# Patient Record
Sex: FEMALE | Race: WHITE
[De-identification: names, ages, dates, MRNs, and addresses within clinical notes are randomized per-mention and may not be internally consistent; named-entity substitution may affect disease eponyms.]

---

## 2021-01-21 ENCOUNTER — HOSPITAL ENCOUNTER (OUTPATIENT)
Dept: HOSPITAL 82 - ENDO | Age: 61
Discharge: HOME | End: 2021-01-21
Attending: SURGERY
Payer: COMMERCIAL

## 2021-01-21 VITALS — SYSTOLIC BLOOD PRESSURE: 110 MMHG | DIASTOLIC BLOOD PRESSURE: 56 MMHG

## 2021-01-21 VITALS — BODY MASS INDEX: 31.15 KG/M2 | HEIGHT: 61 IN | WEIGHT: 165 LBS

## 2021-01-21 DIAGNOSIS — F41.9: ICD-10-CM

## 2021-01-21 DIAGNOSIS — K44.9: Primary | ICD-10-CM

## 2021-01-21 DIAGNOSIS — F32.9: ICD-10-CM

## 2021-01-21 DIAGNOSIS — K21.9: ICD-10-CM

## 2021-01-21 DIAGNOSIS — K29.80: ICD-10-CM

## 2021-01-21 DIAGNOSIS — Z20.822: ICD-10-CM

## 2021-01-21 DIAGNOSIS — J43.9: ICD-10-CM

## 2021-02-18 ENCOUNTER — HOSPITAL ENCOUNTER (EMERGENCY)
Dept: HOSPITAL 82 - ED | Age: 61
Discharge: HOME | End: 2021-02-18
Payer: COMMERCIAL

## 2021-02-18 VITALS — SYSTOLIC BLOOD PRESSURE: 139 MMHG | DIASTOLIC BLOOD PRESSURE: 78 MMHG

## 2021-02-18 VITALS — BODY MASS INDEX: 35.8 KG/M2 | HEIGHT: 61 IN | WEIGHT: 189.6 LBS

## 2021-02-18 DIAGNOSIS — K21.9: ICD-10-CM

## 2021-02-18 DIAGNOSIS — Z20.822: ICD-10-CM

## 2021-02-18 DIAGNOSIS — F41.9: ICD-10-CM

## 2021-02-18 DIAGNOSIS — J43.9: Primary | ICD-10-CM

## 2021-02-18 DIAGNOSIS — M54.5: ICD-10-CM

## 2021-02-18 DIAGNOSIS — F32.9: ICD-10-CM

## 2021-02-18 LAB
ALBUMIN SERPL-MCNC: 4.8 G/DL (ref 3.2–5)
ALP SERPL-CCNC: 112 U/L (ref 38–126)
ANION GAP SERPL CALCULATED.3IONS-SCNC: 11 MMOL/L
AST SERPL-CCNC: 38 U/L (ref 9–36)
BASOPHILS NFR BLD AUTO: 0 % (ref 0–3)
BILIRUB UR QL STRIP.AUTO: NEGATIVE
BUN SERPL-MCNC: 41 MG/DL (ref 8–23)
BUN/CREAT SERPL: 18
CHLORIDE SERPL-SCNC: 93 MMOL/L (ref 95–108)
CO2 SERPL-SCNC: 35 MMOL/L (ref 22–30)
COLOR UR AUTO: YELLOW
CREAT SERPL-MCNC: 2.2 MG/DL (ref 0.5–1)
EOSINOPHIL NFR BLD AUTO: 1 % (ref 0–8)
ERYTHROCYTE [DISTWIDTH] IN BLOOD BY AUTOMATED COUNT: 17.9 % (ref 11.5–15.5)
GLUCOSE UR STRIP.AUTO-MCNC: NEGATIVE MG/DL
HCT VFR BLD AUTO: 33.5 % (ref 37–47)
HGB BLD-MCNC: 10.3 G/DL (ref 12–16)
HGB UR QL STRIP.AUTO: NEGATIVE
IMM GRANULOCYTES NFR BLD: 0.3 % (ref 0–5)
KETONES UR STRIP.AUTO-MCNC: NEGATIVE MG/DL
LEUKOCYTE ESTERASE UR QL STRIP.AUTO: NEGATIVE
LYMPHOCYTES NFR BLD: 15 % (ref 15–41)
MCH RBC QN AUTO: 25.1 PG  CALC (ref 26–32)
MCHC RBC AUTO-ENTMCNC: 30.7 G/DL CAL (ref 32–36)
MCV RBC AUTO: 81.7 FL  CALC (ref 80–100)
MONOCYTES NFR BLD AUTO: 7 % (ref 2–13)
MYOGLOBIN SERPL-MCNC: 94 NG/ML (ref 0–62)
NEUTROPHILS # BLD AUTO: 5.1 THOU/UL (ref 2–7.15)
NEUTROPHILS NFR BLD AUTO: 77 % (ref 42–76)
NITRITE UR QL STRIP.AUTO: NEGATIVE
PH UR STRIP.AUTO: 5.5 [PH] (ref 4.5–8)
PLATELET # BLD AUTO: 329 THOU/UL (ref 130–400)
POTASSIUM SERPL-SCNC: 4.1 MMOL/L (ref 3.5–5.1)
PROT SERPL-MCNC: 7.8 G/DL (ref 6.3–8.2)
PROT UR QL STRIP.AUTO: NEGATIVE MG/DL
RBC # BLD AUTO: 4.1 MILL/UL (ref 4.2–5.6)
SODIUM SERPL-SCNC: 135 MMOL/L (ref 137–146)
SP GR UR STRIP.AUTO: 1.01
UROBILINOGEN UR QL STRIP.AUTO: 0.2 E.U./DL

## 2021-03-25 ENCOUNTER — HOSPITAL ENCOUNTER (INPATIENT)
Dept: HOSPITAL 82 - ED | Age: 61
LOS: 2 days | Discharge: HOME | DRG: 190 | End: 2021-03-27
Attending: INTERNAL MEDICINE | Admitting: INTERNAL MEDICINE
Payer: COMMERCIAL

## 2021-03-25 VITALS — DIASTOLIC BLOOD PRESSURE: 78 MMHG | SYSTOLIC BLOOD PRESSURE: 114 MMHG

## 2021-03-25 VITALS — WEIGHT: 175.49 LBS | HEIGHT: 61 IN | BODY MASS INDEX: 33.13 KG/M2

## 2021-03-25 VITALS — SYSTOLIC BLOOD PRESSURE: 118 MMHG | DIASTOLIC BLOOD PRESSURE: 78 MMHG

## 2021-03-25 VITALS — SYSTOLIC BLOOD PRESSURE: 108 MMHG | DIASTOLIC BLOOD PRESSURE: 75 MMHG

## 2021-03-25 VITALS — DIASTOLIC BLOOD PRESSURE: 93 MMHG | SYSTOLIC BLOOD PRESSURE: 151 MMHG

## 2021-03-25 DIAGNOSIS — E87.8: ICD-10-CM

## 2021-03-25 DIAGNOSIS — J45.909: ICD-10-CM

## 2021-03-25 DIAGNOSIS — J96.21: ICD-10-CM

## 2021-03-25 DIAGNOSIS — E87.1: ICD-10-CM

## 2021-03-25 DIAGNOSIS — I50.9: ICD-10-CM

## 2021-03-25 DIAGNOSIS — F32.9: ICD-10-CM

## 2021-03-25 DIAGNOSIS — I13.0: ICD-10-CM

## 2021-03-25 DIAGNOSIS — J43.9: Primary | ICD-10-CM

## 2021-03-25 DIAGNOSIS — K21.9: ICD-10-CM

## 2021-03-25 DIAGNOSIS — F41.9: ICD-10-CM

## 2021-03-25 DIAGNOSIS — E78.5: ICD-10-CM

## 2021-03-25 DIAGNOSIS — N18.30: ICD-10-CM

## 2021-03-25 DIAGNOSIS — N39.0: ICD-10-CM

## 2021-03-25 DIAGNOSIS — Z99.81: ICD-10-CM

## 2021-03-25 DIAGNOSIS — D63.1: ICD-10-CM

## 2021-03-25 DIAGNOSIS — E86.9: ICD-10-CM

## 2021-03-25 DIAGNOSIS — N17.9: ICD-10-CM

## 2021-03-25 DIAGNOSIS — Z20.822: ICD-10-CM

## 2021-03-25 LAB
ALBUMIN SERPL-MCNC: 4.4 G/DL (ref 3.2–5)
ALP SERPL-CCNC: 135 U/L (ref 38–126)
ANION GAP SERPL CALCULATED.3IONS-SCNC: 20 MMOL/L
AST SERPL-CCNC: 32 U/L (ref 9–36)
BACTERIA #/AREA URNS HPF: (no result) HPF
BASOPHILS NFR BLD AUTO: 0 % (ref 0–3)
BILIRUB UR QL STRIP.AUTO: NEGATIVE
BUN SERPL-MCNC: 48 MG/DL (ref 8–23)
BUN/CREAT SERPL: 14
CHLORIDE SERPL-SCNC: 89 MMOL/L (ref 95–108)
CO2 SERPL-SCNC: 22 MMOL/L (ref 22–30)
COLOR UR AUTO: YELLOW
CREAT SERPL-MCNC: 3.5 MG/DL (ref 0.5–1)
D DIMER PPP FEU-MCNC: 1.47 MG/L (ref 0.19–0.6)
EOSINOPHIL NFR BLD AUTO: 2 % (ref 0–8)
ERYTHROCYTE [DISTWIDTH] IN BLOOD BY AUTOMATED COUNT: 17.4 % (ref 11.5–15.5)
GLUCOSE UR STRIP.AUTO-MCNC: NEGATIVE MG/DL
HCT VFR BLD AUTO: 31 % (ref 37–47)
HGB BLD-MCNC: 9.4 G/DL (ref 12–16)
HGB UR QL STRIP.AUTO: (no result)
IMM GRANULOCYTES NFR BLD: 0.3 % (ref 0–5)
INR PPP: 1 RATIO (ref 0.7–1.3)
KETONES UR STRIP.AUTO-MCNC: NEGATIVE MG/DL
LEUKOCYTE ESTERASE UR QL STRIP.AUTO: (no result)
LYMPHOCYTES NFR BLD: 13 % (ref 15–41)
MCH RBC QN AUTO: 24.5 PG  CALC (ref 26–32)
MCHC RBC AUTO-ENTMCNC: 30.3 G/DL CAL (ref 32–36)
MCV RBC AUTO: 80.7 FL  CALC (ref 80–100)
MONOCYTES NFR BLD AUTO: 9 % (ref 2–13)
MYOGLOBIN SERPL-MCNC: 285 NG/ML (ref 0–62)
NEUTROPHILS # BLD AUTO: 7.29 THOU/UL (ref 2–7.15)
NEUTROPHILS NFR BLD AUTO: 76 % (ref 42–76)
NITRITE UR QL STRIP.AUTO: NEGATIVE
PH UR STRIP.AUTO: 5.5 [PH] (ref 4.5–8)
PLATELET # BLD AUTO: 293 THOU/UL (ref 130–400)
POTASSIUM SERPL-SCNC: 4.2 MMOL/L (ref 3.5–5.1)
PROT SERPL-MCNC: 7.4 G/DL (ref 6.3–8.2)
PROT UR QL STRIP.AUTO: (no result) MG/DL
PROTHROMBIN TIME: 10.6 SECONDS (ref 9–12.5)
RBC # BLD AUTO: 3.84 MILL/UL (ref 4.2–5.6)
RBC #/AREA URNS HPF: (no result) RBC/HPF (ref 0–5)
SODIUM SERPL-SCNC: 127 MMOL/L (ref 137–146)
SP GR UR STRIP.AUTO: 1.01
SQUAMOUS URNS QL MICRO: (no result) EPI/HPF
UROBILINOGEN UR QL STRIP.AUTO: 0.2 E.U./DL
WBC #/AREA URNS HPF: (no result) WBC/HPF (ref 0–5)

## 2021-03-25 PROCEDURE — A9540 TC99M MAA: HCPCS

## 2021-03-25 NOTE — NUR
PT RESTING NO NEW COMPLAINTS OFFERED, PT REMAINS WINDED WITH MINIMAL EXERTION
BUT RECOVERS WELL WITH REST, SATS2 REMAIN 100% ON 3L. WILL CONTINUE TO MONITOR

## 2021-03-25 NOTE — NUR
PT RESTING IN THE BED, STATES PAIN "IS GOING AWAY" . CALL LIGHT IN REACH NO
DISTRESS NOTED AT THIS TIME.

## 2021-03-25 NOTE — NUR
PT ALERT AND ORIENTED SATETS THAT SHE HAS HAD WORSENIGN SOB FOR LAST 3 DAYS,
LUNGS VERY DIMINSHED WITH WHEEZES, SOB WITH EXERTION NOTED, PT HAS O2 ON AT 3L
VIA NC, WITH PT BEING O2 DEPENDENT AT HOME AS WELL. PT STATES SHE IS BREATHING
A LITTLE BETTER SINCE SHE DID HOME NEB TX AND INHALER BUT THAT SHE STILL SOB
WITH EXERTION AND HER CHEST STILL HURTS WITH INSPIRATION AND EXPIRATION, THE
PAIN IS MIDSTERNAL AND RADIATES UNDER BREASTS AND AT TIMES THRU TO BACK.

## 2021-03-25 NOTE — NUR
RECEIVED REPORT FOR THIS PT AND IN BED WITH EYES CLOSED.  NO S/S OF DISTRESS
NOTED.  WILL CONTINUE TO OBSERVE

## 2021-03-25 NOTE — NUR
TALKING AND LAUGHING WITH EMS. NO DISTRESS NOTED. STATES BREATHING IMPROVED
SINCE TAKING HOME INHALER AND NEB TX. CONT CONT BACK PAIN WITH BREATHING (HX
COMPRESSION FX)

## 2021-03-25 NOTE — NUR
PT RESTING IN THE BED AXOX3, 02 3L NC IN PLACE, DENIES PAIN. REPOSITIONED FOR
COMFORT, SIDE RAILS UP CALL LIGHT IN REACH, ALL SAFTY MEASURES IN PLACE WILL
CONTINUE TO MONIOTR THE PATIENT.

## 2021-03-25 NOTE — NUR
PT HAD DUONEB AND ALBUTEROL ON EMAR SINCE 0100. DAYSHIFT RT DOCUMENTED OFF ON
THE LATE MEDS, WAS TO BE ADMINISTERED BY NIGHTSHIFT, OR CANCELED BY
NIGHTSHIFT.

## 2021-03-26 VITALS — DIASTOLIC BLOOD PRESSURE: 78 MMHG | SYSTOLIC BLOOD PRESSURE: 113 MMHG

## 2021-03-26 VITALS — SYSTOLIC BLOOD PRESSURE: 111 MMHG | DIASTOLIC BLOOD PRESSURE: 63 MMHG

## 2021-03-26 VITALS — SYSTOLIC BLOOD PRESSURE: 114 MMHG | DIASTOLIC BLOOD PRESSURE: 84 MMHG

## 2021-03-26 VITALS — DIASTOLIC BLOOD PRESSURE: 69 MMHG | SYSTOLIC BLOOD PRESSURE: 115 MMHG

## 2021-03-26 LAB
ALBUMIN SERPL-MCNC: 4 G/DL (ref 3.2–5)
BASOPHILS NFR BLD AUTO: 0 % (ref 0–3)
BUN SERPL-MCNC: 44 MG/DL (ref 8–23)
CHLORIDE SERPL-SCNC: 96 MMOL/L (ref 95–108)
CO2 SERPL-SCNC: 22 MMOL/L (ref 22–30)
CREAT SERPL-MCNC: 2 MG/DL (ref 0.5–1)
EOSINOPHIL NFR BLD AUTO: 0 % (ref 0–8)
ERYTHROCYTE [DISTWIDTH] IN BLOOD BY AUTOMATED COUNT: 17.2 % (ref 11.5–15.5)
HCT VFR BLD AUTO: 31.6 % (ref 37–47)
HGB BLD-MCNC: 9.4 G/DL (ref 12–16)
IMM GRANULOCYTES NFR BLD: 0.4 % (ref 0–5)
LYMPHOCYTES NFR BLD: 5 % (ref 15–41)
MCH RBC QN AUTO: 24.5 PG  CALC (ref 26–32)
MCHC RBC AUTO-ENTMCNC: 29.7 G/DL CAL (ref 32–36)
MCV RBC AUTO: 82.5 FL  CALC (ref 80–100)
MONOCYTES NFR BLD AUTO: 3 % (ref 2–13)
NEUTROPHILS # BLD AUTO: 8.24 THOU/UL (ref 2–7.15)
NEUTROPHILS NFR BLD AUTO: 92 % (ref 42–76)
PLATELET # BLD AUTO: 313 THOU/UL (ref 130–400)
POTASSIUM SERPL-SCNC: 4.6 MMOL/L (ref 3.5–5.1)
RBC # BLD AUTO: 3.83 MILL/UL (ref 4.2–5.6)
SODIUM SERPL-SCNC: 131 MMOL/L (ref 137–146)

## 2021-03-26 NOTE — NUR
PT IN BED WITH EYES CLOSED.  NO S/S OF DISTRESS. RESPIRATION EVEN AND NON
LABORED.  UP TO BSC WITH 1 PERSON STANDBY ASSIST.  MEDICATION GIVEN AND
TOLERATED WELL.  NORMAL SALINE RUNNING AT 50ML/HR AND TOLERATING WELL. PT HAS
NON PRODUCTIVE COUGH NOTED.  DENIES PAIN AND DISCOMFORT.  CALL LIGHT IS WITHIN
REACH.  WILL CONTINUE TO OBSERVE

## 2021-03-26 NOTE — NUR
SHIFT CHANGE REPORT, PT AWAKE ALERT AND ORIENTED SITTING UP AT BEDSIDE, NO C/O
DISCOMFORT, IVF INFUSING, CALL BELL IN REACH.

## 2021-03-27 VITALS — SYSTOLIC BLOOD PRESSURE: 160 MMHG | DIASTOLIC BLOOD PRESSURE: 87 MMHG

## 2021-03-27 VITALS — SYSTOLIC BLOOD PRESSURE: 142 MMHG | DIASTOLIC BLOOD PRESSURE: 72 MMHG

## 2021-03-27 LAB
ALBUMIN SERPL-MCNC: 4.2 G/DL (ref 3.2–5)
ANION GAP SERPL CALCULATED.3IONS-SCNC: 15 MMOL/L
BUN SERPL-MCNC: 36 MG/DL (ref 8–23)
BUN/CREAT SERPL: 29
CHLORIDE SERPL-SCNC: 99 MMOL/L (ref 95–108)
CO2 SERPL-SCNC: 26 MMOL/L (ref 22–30)
CREAT SERPL-MCNC: 1.2 MG/DL (ref 0.5–1)
ERYTHROCYTE [DISTWIDTH] IN BLOOD BY AUTOMATED COUNT: 17.2 % (ref 11.5–15.5)
HCT VFR BLD AUTO: 31.9 % (ref 37–47)
HGB BLD-MCNC: 9.2 G/DL (ref 12–16)
MAGNESIUM SERPL-MCNC: 2.4 MG/DL (ref 1.6–2.3)
MCH RBC QN AUTO: 24.2 PG  CALC (ref 26–32)
MCHC RBC AUTO-ENTMCNC: 28.8 G/DL CAL (ref 32–36)
MCV RBC AUTO: 83.9 FL  CALC (ref 80–100)
PLATELET # BLD AUTO: 329 THOU/UL (ref 130–400)
POTASSIUM SERPL-SCNC: 4.4 MMOL/L (ref 3.5–5.1)
RBC # BLD AUTO: 3.8 MILL/UL (ref 4.2–5.6)
SODIUM SERPL-SCNC: 135 MMOL/L (ref 137–146)

## 2021-03-27 NOTE — NUR
PT UPSET SHE DID NOT RECEIVE HER BREATHING TREATMENT. SUPERVISOR IN TO SEE THE
PT. THIS NURSE IS GIVING THE PT HER BREATHING TREATMENT.

## 2021-03-27 NOTE — NUR
DISCHARGE ORDERS GIVEN UNDERSTOOD AND SIGNED BY THE PT, PT LEFT TO GO TO HER
OWN HOME, WITH HER OWN O2.

## 2021-03-27 NOTE — NUR
PATIENT CURRENTLY RESTING WITH EYES CLOSED. RESPRIATIONS EVEN O2M2L N/C.. C/O
PAIN ADDRESSED WITH PRN MORPHINE. VAD INFUSING NS AT 50ML/HR. TOLERATTING OK.
FALL PRECAUTIONS IN PLACE. BED IN LOW POSITION. CALL LIGHT IN REACH.

## 2021-03-27 NOTE — NUR
PT HAS A DISCHAGRE ORDER. STATES SHE FEELS BETTER AFTER HAVING A BREATHING
TREATMENT. H.L. REMOVED TIP INTACT.

## 2021-03-27 NOTE — NUR
MD AT THE BEDSIDE , INSTRUCTED THE PATIENT SHE WILL BE DISCHARGED TO HOME
TODAY. NO SOB OR RESP DISTRESS NOTED AT THIS TIME.

## 2021-03-27 NOTE — NUR
PT RESTING IN THE BED, AXOX3 NOTED LABOURED BREATHING. PT GIVEN INHALER TO
USE. DENIES PAIN AT THIS TIME. REPOSITIOEND FOR COMFORT, SIDE RAILS UP CALL
LIGHT IN REACH BED LOCKED IN LOW POSITION, WILL CONTINUE TO MONITOR THE
PATIENT.

## 2021-03-30 NOTE — NUR
Pneumonia post discharge call completed today, 3/30/21.  Pt. has been admitted
to hospital in [place] since discharge from [facility].

## 2021-11-15 NOTE — NUR
PT ARRIVED TO THE UNIT VIA WHEELCHAIR TRANSPORT.  ALERT AND ORIENTED AND ABLE
TO MAKE NEEDS KNOWN.  EXPIRATORY WHEEZING NOTED IN ALL LUNG FIELDS.  O2
APPLIED AT 3LNC AND SITTING UP IN BED WITH NO DISTRESS NOTED. COMPLAINS OF
MILD PAIN TO DIAPHRAGM AREA AND STATES ITS FROM HER COMPRESSION FRACTURES.
CONTINENT OF B/B AND USES THE BSC WITH STANDBY ASSIST.  CALL LIGHT WITHIN
REACH.  WILL CONTINUE TO OBSERVE Patient and her caretaker aid Mona came in to the Clinic without an appointment and requested information about a phone call from Dr. Navarro re: lab results.  Writer discussed the Hgb lab per the message Dr. Navarro left for patient and Mona on 11/4/2021.  Reviewed medications with patient and Mona including the Iron and per Mona, Patient oftentimes does not take her meds. The Fe was never picked up at the pharmacy and they use an OTC Iron supplement. Asked Mona to bring the medication wit her to the next appt. To check the dosage. Patient was prescribed Iron to take 3 times per day and patient has been taking one time per day but frequently forgets. Gave patient a pill box and asked Mona to help place the pills in the box for her. Also gave patient the [employer] prescription assistance phone number to help with the cost of medications.  Patient aware of appt. On 11/23/2021. No further concerns or questions at this time.